# Patient Record
(demographics unavailable — no encounter records)

---

## 2024-11-11 NOTE — DISCUSSION/SUMMARY
[FreeTextEntry1] : Ms. Sanz has a history of asthma and chronic cough and sputum.  A prior sputum culture grew out haemophilus parainfluenza but was felt to be colonized and therefore no treatment.  Once again she is coughing significantly with intermittent white sputum.  I have asked her to repeat sputum culture and to start Augmentin 875 mg twice a day for 10 days as if there is haemophilus there and this is an exacerbation that should resolve the issue.  The patient feels better with the addition of a long-acting muscarinic antagonist but this causes severe hoarseness when she took it via Trelegy.  We will try Breztri as this is not an inhaled powder about a HFA and hopefully will not cause hoarseness.  If she notes the same symptoms then she will revert to the Breo twice a day.  Will perform pulmonary function test her next visit.  Her prior CAT scans did not show any anatomic abnormalities i.e. bronchiectasis and there is no evidence of sinus disease at this time on exam. The patient understands and agrees with the plan of care. Today's office visit encompassed 42 minutes. I conducted an extensive history, physical exam and reviewed diagnosis and treatment options including diagnostic tests radiology studies including cat scans and the use of prescription medication.

## 2024-11-11 NOTE — HISTORY OF PRESENT ILLNESS
[Former] : former [Never] : never [TextBox_4] : 69 female hx ch cough  quit tobacco 36 y ago started on trelegy last visit Jul 2024 today feels good  hx asthma and ++cough 2 y ago and mucous on symbicort and changed to trelegy but noted hoarse changed to breo  and no hoarse in voice  and notes some inc  dyspnea and now cough and mucous prior sputum cx h parainfluenza   no chest pain no wheeze some congestion no post nasal drip syndrome   [TextBox_11] : 0.25 [TextBox_13] : 5 [YearQuit] : 1988

## 2024-11-11 NOTE — REASON FOR VISIT
[Follow-Up] : a follow-up visit [Asthma] : asthma [Cough] : cough [TextEntry] :  4 months. Patient states she is not breathing as well as she was on the Trelegy, but could not take due to adverse reactions. She states she feels a heaviness, but just not breathing as well. Patient does have a white/clear thick gummy phlegm producing cough.

## 2024-12-02 NOTE — PHYSICAL EXAM
[No Acute Distress] : no acute distress [Normal Oropharynx] : normal oropharynx [Normal Appearance] : normal appearance [No Neck Mass] : no neck mass [Normal Rate/Rhythm] : normal rate/rhythm [Normal S1, S2] : normal s1, s2 [No Murmurs] : no murmurs [No Resp Distress] : no resp distress [Rhonchi] : rhonchi [No Abnormalities] : no abnormalities [Benign] : benign [Normal Gait] : normal gait [No Clubbing] : no clubbing [No Cyanosis] : no cyanosis [No Edema] : no edema [FROM] : FROM [Normal Color/ Pigmentation] : normal color/ pigmentation [No Focal Deficits] : no focal deficits [Oriented x3] : oriented x3 [Normal Affect] : normal affect [TextBox_68] : Slight rhonchi right base

## 2024-12-02 NOTE — REASON FOR VISIT
[Follow-Up] : a follow-up visit [Asthma] : asthma [Cough] : cough [TextEntry] : Patient states she finished the antibiotic and has been using the Breztri.  She is feeling better.  She was unable to have a sputum test due to the mucus clearing up and she could not get a sample.  Patient had a PFT today.

## 2024-12-02 NOTE — DISCUSSION/SUMMARY
[FreeTextEntry1] : Ms. Sanz  has mild obstructive airways disease.  Her PFTs are normal.  She has haemophilus parainfluenza in the past and her symptoms seem to respond to antibiotics once again.  She has some slight rhonchi at the right base although prior CAT scan showed no significant anatomic abnormality.  She likely has some scarring from prior pulmonary infarct and this is a nidus for infection.  She is well-maintained on the Breztri 2 sprays twice a day we will continue same. The patient understands and agrees with plan of care. Today's office visit encompassed 32 minutes which excludes teaching and separately reported services.. I conducted an extensive history, physical exam and reviewed diagnosis and treatment options including diagnostic tests,radiology studies including cat scans and the use of prescription medication.

## 2024-12-02 NOTE — HISTORY OF PRESENT ILLNESS
[Former] : former [Never] : never [TextBox_4] : 69 female hx h parainfluenza and resolved and with augmentin using breztri 2 bid  no longer hoarse voice  feels good  no so b  no cough no chest pain no tightness full activity  no nite awakening [TextBox_11] : .25 [TextBox_13] : 5 [YearQuit] : 1988

## 2025-03-12 NOTE — HISTORY OF PRESENT ILLNESS
[FreeTextEntry1] : Follow up [de-identified] : 69 year old female presents for a follow up.  h/o PE no longer following with hematology at [place] off of Eliquis  has asthma she follows with pulmonology switched to Breztri reports improvement to her cough   Has h/o right sided breast cancer s/p lumpectomy, radiation following with oncology  Off of Tamoxifen  up to date with mammogram   Has anxiety/depression, on Lexapro mood has been well controlled with medication  Has hyperlipidemia- on Pravastatin   Has hypothyroidism, on Levothyroxine  Has hiatal hernia, on Omeprazole   Has bladder/uterine prolapse  she follows with urogynecology at [place]   Last blood work revealed elevated potassium- patient has reduced potassium rich foods in her diet   She is returning to the office in May 2025 for an annual physical.

## 2025-03-12 NOTE — ASSESSMENT
[FreeTextEntry1] : Hyperlipidemia: LDL previously not at goal  c/w Pravastatin c/w diet and exercise as tolerated will plan to check blood work in May 2025 during patient's physical   Prediabetes: last A1C 5.8  c/w diet and exercise as tolerated will plan to check blood work in May 2025   Hypothyroidism: previously stable c/w Levothyroxine will plan to check blood work in May 2025   Anxiety/Depression: stable c/w Lexapro  Asthma: c/w Breztri  following with pulmonology  Osteopenia: c/w Vitamin D3 supplementation weight bearing exercises as tolerated last bone density done in March 2024   H/o Breast cancer: s/p lumpectomy, radiation off of Tamoxifen up to date with mammogram  Hyperkalemia: will check updated blood work in May 2025

## 2025-03-12 NOTE — HEALTH RISK ASSESSMENT
[Former] : Former [0-4] : 0-4 [> 15 Years] : > 15 Years [0] : 2) Feeling down, depressed, or hopeless: Not at all (0) [PHQ-2 Negative - No further assessment needed] : PHQ-2 Negative - No further assessment needed [[id number]] : 0

## 2025-05-05 NOTE — HISTORY OF PRESENT ILLNESS
[FreeTextEntry1] : Annual physical  [de-identified] : 69 year old female presents for an annual physical. She reports over the past 1 and 1/2 months, she has started to experience more persistent episodes of feeling exhausted and fatigued after doing certain activities such as gardening, cleaning her house, exercise. She will feel mildly short of breath along with the fatigue and find that she will need to sit down and rest. Upon resting, she notes feeling a tingling sensation to the left side of her head, near her temporal region. She will feel better after resting. She denies any chest pain. She states she feels her breathing has been fine- no cough, no fever. She has been worried as she has a h/o pulmonary emboli- was concerned there could be a correlation.   h/o PE no longer following with hematology at [place] off of Eliquis  has asthma she follows with pulmonology on Breztri- doing well   Has h/o right sided breast cancer s/p lumpectomy, radiation following with oncology  Off of Tamoxifen  up to date with mammogram   Has anxiety/depression, on Lexapro mood has been well controlled with medication  Has hyperlipidemia- on Pravastatin   Has hypothyroidism, on Levothyroxine  Has hiatal hernia, on Omeprazole  had endoscopy in July 2024   Has bladder/uterine prolapse  she follows with urogynecology at [place]   up to date with Flu, Pneumonia, Shingles, COVID vaccinations

## 2025-05-05 NOTE — ASSESSMENT
[Vaccines Reviewed] : Immunizations reviewed today. Please see immunization details in the vaccine log within the immunization flowsheet.  [FreeTextEntry1] : Health care maintenance: check blood work follow up with optometry/ophthalmology and dentist for routine exams when due follow up with GYN when due up to date with mammogram up to date with colonoscopy  c/w diet and exercise as tolerated up to date with vaccinations   Dyspnea on exertion with Fatigue: vitals, exam stable  check blood work, including D-dimer level recommend follow up with cardiology   Asthma: c/w Breztri c/w management as per pulmonology   Hyperlipidemia: c/w Pravastatin c/w diet and exercise as tolerated check blood work   Prediabetes: c/w diet and exercise as tolerated check blood work  Hypothyroidism: c/w Levothyroxine check blood work   Anxiety/Depression: stable c/w Lexapro  Osteopenia: c/w Vitamin D3 supplementation weight bearing exercises as tolerated last bone density done in March 2024   H/o Breast cancer: s/p lumpectomy, radiation off of Tamoxifen up to date with mammogram

## 2025-05-05 NOTE — HEALTH RISK ASSESSMENT
[No] : In the past 12 months have you used drugs other than those required for medical reasons? No [No falls in past year] : Patient reported no falls in the past year [None] : Patient does not have any barriers to medication adherence [Yes] : Reviewed medication list for presence of high-risk medications. [Benzodiazepines] : benzodiazepines [Opioids] : opioids [Former] : Former [> 15 Years] : > 15 Years [Patient reported mammogram was normal] : Patient reported mammogram was normal [With Significant Other] : lives with significant other [] :  [Fully functional (bathing, dressing, toileting, transferring, walking, feeding)] : Fully functional (bathing, dressing, toileting, transferring, walking, feeding) [Fully functional (using the telephone, shopping, preparing meals, housekeeping, doing laundry, using] : Fully functional and needs no help or supervision to perform IADLs (using the telephone, shopping, preparing meals, housekeeping, doing laundry, using transportation, managing medications and managing finances) [Smoke Detector] : smoke detector [Carbon Monoxide Detector] : carbon monoxide detector [Seat Belt] :  uses seat belt [Sunscreen] : uses sunscreen [With Patient/Caregiver] : , with patient/caregiver [Designated Healthcare Proxy] : Designated healthcare proxy [Relationship: ___] : Relationship: [unfilled] [de-identified] : Gardening, walking  [de-identified] : Diet is generally good  [5-9] : 5-9 [Change in mental status noted] : No change in mental status noted [Reports changes in hearing] : Reports no changes in hearing [Reports changes in vision] : Reports no changes in vision [MammogramDate] : 02/2025 [BoneDensityDate] : 03/2024 [BoneDensityComments] : Osteopenia  [ColonoscopyDate] : 07/2024 [ColonoscopyComments] : Polyps, Internal hemorrhoids  [AdvancecareDate] : 05/2025

## 2025-05-05 NOTE — REVIEW OF SYSTEMS
[Fatigue] : fatigue [Dyspnea on Exertion] : dyspnea on exertion [Negative] : Heme/Lymph [Fever] : no fever [Chills] : no chills [Cough] : no cough

## 2025-05-05 NOTE — PHYSICAL EXAM
[No Acute Distress] : no acute distress [Well Nourished] : well nourished [Well Developed] : well developed [Well-Appearing] : well-appearing [Normal Sclera/Conjunctiva] : normal sclera/conjunctiva [PERRL] : pupils equal round and reactive to light [Normal Oropharynx] : the oropharynx was normal [Normal TMs] : both tympanic membranes were normal [Normal Appearance] : was normal in appearance [Neck Supple] : was supple [Normal] : the thyroid was normal [No Respiratory Distress] : no respiratory distress  [Clear to Auscultation] : lungs were clear to auscultation bilaterally [Normal Rate] : normal rate  [Regular Rhythm] : with a regular rhythm [Normal S1, S2] : normal S1 and S2 [No Murmur] : no murmur heard [No Carotid Bruits] : no carotid bruits [No Edema] : there was no peripheral edema [Soft] : abdomen soft [Non Tender] : non-tender [Normal Bowel Sounds] : normal bowel sounds [Normal Posterior Cervical Nodes] : no posterior cervical lymphadenopathy [Normal Anterior Cervical Nodes] : no anterior cervical lymphadenopathy [Grossly Normal Strength/Tone] : grossly normal strength/tone [No Rash] : no rash [No Focal Deficits] : no focal deficits [Alert and Oriented x3] : oriented to person, place, and time [Normal Mood] : the mood was normal [Normal Insight/Judgement] : insight and judgment were intact

## 2025-06-02 NOTE — HISTORY OF PRESENT ILLNESS
[Former] : former [Never] : never [TextBox_4] : 69 female mild asthma today feels good  no sob no cough no wheeze no chest pain no tightness use breztri bid and full activity no nite awakening  [TextBox_11] : .25 [TextBox_13] : 5 [YearQuit] : 1988

## 2025-06-02 NOTE — REASON FOR VISIT
[Follow-Up] : a follow-up visit [Asthma] : asthma [TextEntry] : Patient states her asthma has been under control with the breztri.

## 2025-06-02 NOTE — DISCUSSION/SUMMARY
Spiritual Plan of Care    Pt Name: Adela Farrell  Pt : 1964  Date: 2021    Visit Type: In person    Referral Source: Interdisciplinary team    Reason for Visit: Routine Visit    Visited With: Patient    Length of Visit: 2 minutes    Requires Follow-up: No      Spiritual Care Consult Needed: No needs at this time    Spiritual Care Visit Preference:     Taxonomy:    · Intended Effects: Build relationship of care and support, Demonstrate caring and concern, Establish rapport and connectedness  · Methods: Demonstrate acceptance, Offer support  · Interventions: Acknowledge current situation, Active listening, Ask guided questions      Patient Affect at Time of Visit: Not open to  Visit, Sad, Scared, Guarded  Patient Assessment: Anxious, Frustrated, Lonely  Patient  Intervention: Emotional Support, Empathic Listening     [FreeTextEntry1] : Ms. Sanz is doing well on the Breztri.  She has been on multiple medications including ICS LABA combinations and feels best on the Breztri.  We will continue all therapy.  We will follow-up pulmonary Ana test her next visit. The patient understands and agrees with plan of care. Today's office visit encompassed 32 minutes which excludes teaching and separately reported services.. I conducted an extensive history, physical exam and reviewed diagnosis and treatment options including diagnostic tests,radiology studies including cat scans and the use of prescription medication.

## 2025-07-15 NOTE — PHYSICAL EXAM
[No Acute Distress] : no acute distress [Well Nourished] : well nourished [Well Developed] : well developed [Normal Appearance] : was normal in appearance [Neck Supple] : was supple [No Respiratory Distress] : no respiratory distress  [Clear to Auscultation] : lungs were clear to auscultation bilaterally [Normal Rate] : normal rate  [Regular Rhythm] : with a regular rhythm [Normal S1, S2] : normal S1 and S2 [No Murmur] : no murmur heard [No Edema] : there was no peripheral edema [No Focal Deficits] : no focal deficits [Alert and Oriented x3] : oriented to person, place, and time [de-identified] : + tenderness to palpation to right mid back, thoracic region with muscle tightness

## 2025-07-15 NOTE — HISTORY OF PRESENT ILLNESS
[FreeTextEntry8] : 69 year female with h/o right sided breast cancer s/p lumpectomy, radiation, off of Tamoxifen, anxiety/depression, hyperlipidemia, pre-diabetes, hypothyroidism, hiatal hernia, uterine prolapse, asthma, h/o PE presents c/o 2 week h/o pain to her left mid back that is worse with movement. She reports associated muscle spasms to that area. She has tried taking Aleve with some relief. She also c/o episodes of dizziness and tingling sensation through out her body over the past 3-4 weeks. She has had associated nausea. She had recently gotten out of her car and felt dizzy and off balanced. She had been working outside for a short while yesterday and then felt lightheaded. She came inside, took a shower, hydrated with water, and then laid down to try to help. She had felt a tingling sensation through out her body.

## 2025-07-15 NOTE — ASSESSMENT
[FreeTextEntry1] : Acute right sided back pain: likely muscular in etiology  will refer patient for x-rays of thoracic spine and ribs for further evaluation  trial of Tizanidine, take as directed, advised on drowsy side effects, not to drive while on medication can c/w Aleve PRN  Paresthesias: check blood work referred to neurology   Lightheadedness: maintain water hydration  check blood work   Hypothyroidism: on Levothyroxine check blood work